# Patient Record
Sex: FEMALE | Race: BLACK OR AFRICAN AMERICAN | NOT HISPANIC OR LATINO
[De-identification: names, ages, dates, MRNs, and addresses within clinical notes are randomized per-mention and may not be internally consistent; named-entity substitution may affect disease eponyms.]

---

## 2023-09-26 PROBLEM — Z00.00 ENCOUNTER FOR PREVENTIVE HEALTH EXAMINATION: Status: ACTIVE | Noted: 2023-09-26

## 2023-10-05 ENCOUNTER — APPOINTMENT (OUTPATIENT)
Dept: GYNECOLOGIC ONCOLOGY | Facility: CLINIC | Age: 48
End: 2023-10-05
Payer: COMMERCIAL

## 2023-10-05 ENCOUNTER — NON-APPOINTMENT (OUTPATIENT)
Age: 48
End: 2023-10-05

## 2023-10-05 VITALS
HEART RATE: 67 BPM | WEIGHT: 168 LBS | BODY MASS INDEX: 27.99 KG/M2 | HEIGHT: 65 IN | DIASTOLIC BLOOD PRESSURE: 91 MMHG | SYSTOLIC BLOOD PRESSURE: 154 MMHG

## 2023-10-05 DIAGNOSIS — Z78.9 OTHER SPECIFIED HEALTH STATUS: ICD-10-CM

## 2023-10-05 DIAGNOSIS — N83.299 OTHER OVARIAN CYST, UNSPECIFIED SIDE: ICD-10-CM

## 2023-10-05 PROCEDURE — 99205 OFFICE O/P NEW HI 60 MIN: CPT

## 2023-10-05 RX ORDER — TERAZOSIN 5 MG/1
CAPSULE ORAL
Refills: 0 | Status: ACTIVE | COMMUNITY

## 2023-10-05 RX ORDER — METOPROLOL SUCCINATE 50 MG/1
50 TABLET, EXTENDED RELEASE ORAL
Refills: 0 | Status: ACTIVE | COMMUNITY

## 2023-10-05 RX ORDER — TERAZOSIN HCL 2 MG
TABLET ORAL
Refills: 0 | Status: ACTIVE | COMMUNITY

## 2023-10-05 RX ORDER — METOPROLOL TARTRATE 75 MG/1
TABLET, FILM COATED ORAL
Refills: 0 | Status: ACTIVE | COMMUNITY

## 2023-10-05 RX ORDER — LOSARTAN POTASSIUM 100 MG/1
TABLET, FILM COATED ORAL
Refills: 0 | Status: ACTIVE | COMMUNITY

## 2023-12-20 NOTE — HISTORY OF PRESENT ILLNESS
[FreeTextEntry1] : Ms. STACIE PAK is a 47 year old  female, referred from Dr. Marquez for an ovarian cyst.  She has complaints of heavy menses with clots and RLQ pain with intercourse.  2023- Estradiol- 839, LDH- 191, ca125- 18.7, HE4- 62.4   23- Pelvic US: left ovarian complex cyst measuring 2.9 x 2.6 x 3.6 cm with fluid surrounding ovary.  2023 EMBx- benign secretory endometrium   JAZLYN Premenopausal- 1.21 H  PMHx- HTN, Anemia PSHx-  x1, gastric sleeve, revised gastric sleeve with hiatal hernia repair requiring gastric bypass, abdominoplasty, breast reduction Family hx of cancer- none  She denies abdominal bloating/distention.  She denies a change in appetite, or a change in weight.  She is tolerating PO without nausea or vomiting.  She denies any change in bowel or bladder habits.  She denies any other associated signs or symptoms.   She is here with her  Darin to discuss further management. 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   HM: Pap- - negative Mammo- - negative Colonoscopy-  negative  Referred by (GYN) Dr. Marquez PCP: Dr. Riley GI: Dr. Xiao Neurologist: Dr. Shaffer

## 2023-12-20 NOTE — DISCUSSION/SUMMARY
[FreeTextEntry1] : [] pelvic MRI ordered for further evaluation - I sat down with Jolly and Darin and reviewed the differential diagnosis and management of cystic adnexal lesions, including benign, borderline and malignant etiologies.  If the MRI demonstrates persistence of the cyst and it is suspicious for borderline or malignant etiology, based on the size and complex nature, surgical resection is reasonable. If it iappears benign, given her extensive surgical history, the other option is continued close radiographic followup.  She is a candidate for a robotic LSC approach if indicated.  She and Darin expressed their understanding of the information presented and opted to proceed with MRI and we will discuss further after that.. All questions were answered to their apparent satisfaction.  addendum 12/20/23: Chart review reveals no MRI was scheduled nor performed; tasked RN list to check in with patient re followup. TIAGO

## 2023-12-20 NOTE — PHYSICAL EXAM
[de-identified] : abdominoplasty and multiple surgical scars [de-identified] : uterus mobile, no palpable adnexal mass